# Patient Record
Sex: MALE | Race: WHITE
[De-identification: names, ages, dates, MRNs, and addresses within clinical notes are randomized per-mention and may not be internally consistent; named-entity substitution may affect disease eponyms.]

---

## 2018-02-12 ENCOUNTER — HOSPITAL ENCOUNTER (OUTPATIENT)
Dept: HOSPITAL 80 - FCATH | Age: 53
Setting detail: OBSERVATION
LOS: 1 days | Discharge: HOME | End: 2018-02-13
Attending: INTERNAL MEDICINE | Admitting: INTERNAL MEDICINE
Payer: COMMERCIAL

## 2018-02-12 DIAGNOSIS — I25.119: Primary | ICD-10-CM

## 2018-02-12 DIAGNOSIS — E78.5: ICD-10-CM

## 2018-02-12 DIAGNOSIS — I10: ICD-10-CM

## 2018-02-12 LAB
INR PPP: 1.06 (ref 0.83–1.16)
PLATELET # BLD: 220 10^3/UL (ref 150–400)
PROTHROMBIN TIME: 14 SEC (ref 12–15)

## 2018-02-12 PROCEDURE — C1887 CATHETER, GUIDING: HCPCS

## 2018-02-12 PROCEDURE — G0378 HOSPITAL OBSERVATION PER HR: HCPCS

## 2018-02-12 PROCEDURE — C1725 CATH, TRANSLUMIN NON-LASER: HCPCS

## 2018-02-12 PROCEDURE — C1874 STENT, COATED/COV W/DEL SYS: HCPCS

## 2018-02-12 PROCEDURE — C1760 CLOSURE DEV, VASC: HCPCS

## 2018-02-12 PROCEDURE — 93005 ELECTROCARDIOGRAM TRACING: CPT

## 2018-02-12 PROCEDURE — 93458 L HRT ARTERY/VENTRICLE ANGIO: CPT

## 2018-02-12 PROCEDURE — C1769 GUIDE WIRE: HCPCS

## 2018-02-12 PROCEDURE — C9600 PERC DRUG-EL COR STENT SING: HCPCS

## 2018-02-12 PROCEDURE — 92928 PRQ TCAT PLMT NTRAC ST 1 LES: CPT

## 2018-02-12 RX ADMIN — METOPROLOL TARTRATE SCH MG: 25 TABLET, FILM COATED ORAL at 22:19

## 2018-02-12 RX ADMIN — HYDROCODONE BITARTRATE AND ACETAMINOPHEN PRN TAB: 5; 325 TABLET ORAL at 19:02

## 2018-02-12 RX ADMIN — HYDROCODONE BITARTRATE AND ACETAMINOPHEN PRN TAB: 5; 325 TABLET ORAL at 14:07

## 2018-02-12 NOTE — PDDXCAT
Diagnostic Cath Note





- .


Date: 02/12/18


: May


Indication: CCC Class III and IV angina on medical treatment





- Procedure


Access: right groin


Procedure: left heart catheterization, coronary angiography, left ventriculogram





- Materials


Left Heart Cath size: 6F


Left Heart Cath materials: standard multipack (JL4, JR4, pigtail)





- Findings-Left Heart Catheterization


LM: Medium diameter vessel with bifurcation into the LAD and LCX.  No luminal 

irregularities were noted.


LAD: Medium to large diameter vessel with a large, principal Diagonal in the 

mid LAD.  Distal to the diagonal take off, there was a critical 80% lesion to 

the LAD.


LCX: Medium diameter vessel with a widely patent stent to the mid LCX, distal 

to an OM (principal).  There was mild haziness to the vessel just proximal to 

the stent with upwards of 30-40% stenosis noted.


RCA: The RCA is the dominant vessel (PDA/LOLIS supply) with 20% stenosis 

proximally.  No other appreciable CAD was noted.


EDP: 23 mm Hg


LVEF: 60%


Wall motion: subtle anterior wall hypokinesis was noted.


Complications: none


Estimated blood loss: <50ml


Closure method: Angioseal


Assessment: Patient is a 53 y/o male with known CAD to the LCX with HTN and HLP

, who presented to outpatient cardiology with complaints of chest pain/pressure 

similar to that which was noted prior to LCX stent.  Critical mid LAD lesion 

was noted.


Plan: 





Dr. TRISTAN Camarillo to perform PCI to the critical lesion


Intervention: 





mid LAD


Patient Problems: 


 Problems











Problem Status Onset


 


Chest pain Acute

## 2018-02-12 NOTE — CPEKG
Heart Rate: 52

RR Interval: 1154

P-R Interval: 196

QRSD Interval: 82

QT Interval: 420

QTC Interval: 391

P Axis: 75

QRS Axis: 38

T Wave Axis: 40

EKG Severity - NORMAL ECG -

EKG Impression: SINUS RHYTHM

Electronically Signed By: Ibrahima Olvera 13-Feb-2018 10:22:52

## 2018-02-12 NOTE — PDDXCAT
Diagnostic Cath Note





- .


Date: 02/12/18


: Marquise


Assistant: Marquise


Indication: CCC Class III and IV angina on medical treatment


Estimated blood loss: <50ml


Closure method: Angioseal


Assessment: Successful PCI of the mid-LAD using a single drug coated stent.


Intervention: 


Please refer to the diagnostic cardiac catheterization report by Dr. Olvera. 

Briefly, the patient is a 52-year-old male with a history of CAD and previous 

PCI of the circumflex. He presented with recurrent CCS class III to IV angina. 

Diagnostic cardiac catheterization demonstrated preserved left ventricular 

systolic function, a patent stent site in the circumflex, non-flow-limiting CAD 

in the RCA, and a high grade stenosis of 80-90% in the mid-LAD. Based on the 

patient's clinical history diagnostic angiography, the decision was made to 

perform PCI.


The patient received intravenous Angiomax. A 6 Urdu CLS 3.5 guide catheter 

was advanced to the left main. An Intuition guide was advanced to the apical 

portion of the left anterior descending. Predilatation of the target lesion was 

done using a 2.5 x 10 mm Sprinter balloon. A 3.0 x 16 mm Synergy stent was 

positioned at the target lesion was deployed at high pressure. Final angiograms 

demonstrated 0% residual stenosis and AMELIE-III flow.





Patient Problems: 


 Problems











Problem Status Onset


 


Chest pain Acute

## 2018-02-12 NOTE — PDHPUP
History & Physical Update


H&P update statement: 


This history and physical update is based on an assessment of the patient which 

was completed after admission or registration (within 24 hours), but prior to 

the surgery/procedure.





H&P update: H&P reviewed & patient examined, no change in patient's condition 

since H&P completed (Patient has continued to note chest discomfort (pains))

## 2018-02-12 NOTE — CPEKG
Heart Rate: 58

RR Interval: 1034

P-R Interval: 168

QRSD Interval: 84

QT Interval: 412

QTC Interval: 405

P Axis: 42

QRS Axis: 10

T Wave Axis: 40

EKG Severity - NORMAL ECG -

EKG Impression: SINUS RHYTHM

Electronically Signed By: Jas Khan 12-Feb-2018 10:36:25

## 2018-02-13 VITALS
DIASTOLIC BLOOD PRESSURE: 63 MMHG | OXYGEN SATURATION: 97 % | TEMPERATURE: 98.1 F | RESPIRATION RATE: 16 BRPM | HEART RATE: 65 BPM | SYSTOLIC BLOOD PRESSURE: 124 MMHG

## 2018-02-13 LAB — PLATELET # BLD: 222 10^3/UL (ref 150–400)

## 2018-02-13 RX ADMIN — METOPROLOL TARTRATE SCH MG: 25 TABLET, FILM COATED ORAL at 10:11

## 2018-02-13 RX ADMIN — HYDROCODONE BITARTRATE AND ACETAMINOPHEN PRN TAB: 5; 325 TABLET ORAL at 04:04

## 2018-02-13 NOTE — CPEKG
Heart Rate: 56

RR Interval: 1071

P-R Interval: 180

QRSD Interval: 82

QT Interval: 396

QTC Interval: 383

P Axis: 67

QRS Axis: 16

T Wave Axis: 36

EKG Severity - BORDERLINE ECG -

EKG Impression: SINUS RHYTHM

EKG Impression: PROBABLE LEFT ATRIAL ABNORMALITY

Electronically Signed By: Ibrahima Olvera 13-Feb-2018 10:22:57

## 2018-02-13 NOTE — ASMTLACE
LACE

 

Length of stay for            Answers:  1 day                                 

current admission                                                             

Acuity / Level of             Answers:  No                                    

Care: Did the patient                                                         

have an inpatient                                                             

admission?                                                                    

Comorbidities - select        Answers:  Congestive heart failure              

all that apply                                                                

# of Emergency department     Answers:  0                                     

visits in the last 6                                                          

months                                                                        

Score: 3

 

Date Signed:  02/13/2018 03:32 PM

Electronically Signed By:Katerine Stern RN

## 2018-02-13 NOTE — ASMTCMCOM
CM Note

 

CM Note                       

Notes:

2/13/2018 Case Management Note



Reviewed chart.



There are no case management d/c needs identified d/t pt age, marital status, employment status and 


activity levels prior to admission.  There are no PT or OT evals ordered at this time.



Case Management d/c poc:  anticipating independent with follow up as directed.



Case Management available if needs change.

 

Date Signed:  02/13/2018 09:48 AM

Electronically Signed By:Katerine Stern RN

## 2018-02-13 NOTE — ASDISCHSUM
----------------------------------------------

Discharge Information

----------------------------------------------

Plan Status:Home with No Needs                       Medically Cleared to Leave:02/12/2018

Discharge Date:02/13/2018 12:38 PM                   CM D/C Disposition:Home, Routine, Self-Care

ADT D/C Disposition:Home, Routine, Self-Care         Projected Discharge Date:02/13/2018 12:38 PM

Transportation at D/C:                               Discharge Delay Reason:

Follow-Up Date:02/13/2018 12:38 PM                   Discharge Slot:

Final Diagnosis:

----------------------------------------------

Placement Information

----------------------------------------------

----------------------------------------------

Patient Contact Information

----------------------------------------------

Contact Name:JAVIER                           Relationship:Wife

Address:[address]                                Home [phone]

                                                     Work [phone]

City:[place]                                Alternate Phone:

[facility]/Zip Code:[place] [address]                              Email:

----------------------------------------------

Financial Information

----------------------------------------------

Financial Class:HMO and PPO Plans

Primary Plan Desc:[employer]                      Primary Plan Number:[id number]

Secondary Plan Desc:                                 Secondary Plan Number:

 

 

----------------------------------------------

Assessment Information

----------------------------------------------

----------------------------------------------

[facility] CM Progress Note

----------------------------------------------

CM Note

 

CM Note                       

Notes:

2/13/2018 Case Management Note



Reviewed chart.



There are no case management d/c needs identified d/t pt age, marital status, employment status and 


activity levels prior to admission.  There are no PT or OT evals ordered at this time.



Case Management d/c poc:  anticipating independent with follow up as directed.



Case Management available if needs change.

 

Date Signed:  02/13/2018 09:48 AM

Electronically Signed By:Katerine Stern RN

 

 

----------------------------------------------

LACE

----------------------------------------------

LACE

 

Length of stay for            Answers:  1 day                                 

current admission                                                             

Acuity / Level of             Answers:  No                                    

Care: Did the patient                                                         

have an inpatient                                                             

admission?                                                                    

Comorbidities - select        Answers:  Congestive heart failure              

all that apply                                                                

# of Emergency department     Answers:  0                                     

visits in the last 6                                                          

months                                                                        

Score: 3

 

Date Signed:  02/13/2018 03:32 PM

Electronically Signed By:Katerine Stern RN

 

 

----------------------------------------------

Intervention Information

----------------------------------------------

## 2018-02-14 NOTE — GDS
[f rep st]



                                                             DISCHARGE SUMMARY





ADMIT DIAGNOSES:  

1.  Coronary artery disease.

2.  Hypertension.

3.  Hyperlipidemia.

4.  Planned cardiac catheterization with possible stent placement.



DISCHARGE DIAGNOSES:  

1.  Coronary artery disease.

2.  Percutaneous coronary intervention of the right coronary artery.

3.  Past percutaneous coronary intervention of the left circumflex.



COURSE OF HOSPITALIZATION:  This gentleman was seen in Cardiology Clinic as an outpatient with compla
ints of chest pain and pressure similar to that prior to his previous stent to the left circumflex. KIMBERLYN pina was taken to the cardiac cath lab by Dr. Ibrahima Olvera, who found a critical lesion of the LAD. Dr. LANDY Camarillo then did the intervention, placing a drug-eluting stent to the LAD lesion with no compl
ications. He then was taken to PCU for overnight observation, where he has done well. He has been up 
ambulating. His right groin site is intact with no bleeding, induration or pain. He has had no chest 
pain or shortness of breath through the night. Telemetry shows a regular sinus rhythm with no arrhyth
mias. At this time, he currently is stable for discharge.



MEDICATIONS:  He will go home on Effient 10 mg 1 tablet daily, Lopressor 25 mg twice daily, lisinopri
l 2.5 mg daily, atorvastatin 40 mg daily, enteric-coated aspirin 325 mg daily, Singulair 10 mg at bed
time, Wellbutrin  mg daily, Ritalin 20 mg twice daily, Viagra 100 mg daily as needed, herbal bradley
pplements daily, albuterol inhaler as needed, Senokot 1-2 tablets twice daily as needed.



ALLERGIES:  He has no known drug allergies.



EXAM ON DAY OF DISCHARGE:  VITAL SIGNS: Blood pressure 124/63, heart rate 65 and regular, oxygen satu
ration 97% on room air, temperature 36.7. EKG shows a sinus bradycardia with a ventricular rate of 48
. HEART: Rate is regular. No murmurs, rubs, gallops. LUNGS: Sounds are clear to auscultation. No whee
zes, rales, or rhonchi. PERIPHERAL PULSES:  Palpable bilaterally at 2+. Right groin site is intact wi
th no bleeding, induration or pain. No femoral bruit noted. NEUROLOGIC: He is alert and oriented, and
 eager for discharge.



LABS:  His lipids showed his total cholesterol 108, triglyceride 53, HDL 48, and LDL 50.



DISCHARGE PLAN:  

1.  He was given verbal and written groin site precautions, to be followed for 1 week.

2.  No heavy lifting, pushing, pulling greater than 10 pounds.

3.  No tub bath, okay to shower.

4.  Should site bleed, hold continuous pressure and go to the nearest ER.

5.  Keep bowel movements soft to avoid bearing down.

6.  Exercise restrictions for only the 1st couple days with gradual 2-3 block walk a day and graduall
y increase activity.

7.  Participate in cardiac rehab program.

8.  Follow up at [facility] in 7-10 days. 



At this time, he currently is stable for discharge.





Job #:  [id number]

## 2019-03-30 ENCOUNTER — HOSPITAL ENCOUNTER (OUTPATIENT)
Dept: HOSPITAL 80 - FED | Age: 54
Setting detail: OBSERVATION
LOS: 1 days | Discharge: HOME | End: 2019-03-31
Payer: COMMERCIAL